# Patient Record
(demographics unavailable — no encounter records)

---

## 2024-10-22 NOTE — ASSESSMENT
[FreeTextEntry1] : 57-year-old woman here for evaluation for abnormal ECG  # RBBB with LAFB on EKG May 2024 # Atypical Chest pain, unlikely to be cardiac - Most recent Echo Grade 2 diastolic dysfunction, normal EF - CCTA ordered: Limited study, No definite stenosis of the LAD and RCA. Left circumflex is of small caliber and not well evaluated. CAD RADS 0/N - Started the pt on metoprolol 25mg, for cardioprotective effects and - counselled the patient on the importance of medication compliance  #A1c noted 7.1 #Lipid profile Chol 208, , non- July 2024 - advised pt to follow up with PCP for better management  RTC In 3-4months or as needed

## 2024-10-22 NOTE — HISTORY OF PRESENT ILLNESS
[FreeTextEntry1] : 57-year-old woman here for evaluation for abnormal ECG  She has obesity, depression and OM of her toes with amputation, was previously seen for RBBB on EKG, Echo: Grade 2 diastolic dysfunction, normal EF, she was asked to follow up after CCTA Atypical chest pain , sore like , reproducible   A1c: 7.1 July 2024 Lipid profile chol 208, , non  July 2024 ECG: RBBB May 2024 Stress test: Negative for ischemia in 2020  echo 1/2024 normal EF no significant valve disease.  CCTA 6/2024 rosalie, Ca score 0. no definite stenosis.

## 2024-10-22 NOTE — PHYSICAL EXAM
[Normal S1, S2] : normal S1, S2 [No Murmur] : no murmur [No Rub] : no rub [No Gallop] : no gallop [Clear Lung Fields] : clear lung fields [Good Air Entry] : good air entry [No Respiratory Distress] : no respiratory distress  [Soft] : abdomen soft [Non Tender] : non-tender [de-identified] : obese [de-identified] : 1+/2+ pitting edema

## 2024-10-22 NOTE — REVIEW OF SYSTEMS
[Chest Discomfort] : chest discomfort [SOB] : no shortness of breath [Dyspnea on exertion] : not dyspnea during exertion [Palpitations] : no palpitations [Orthopnea] : no orthopnea [PND] : no PND [Cough] : no cough [Wheezing] : no wheezing [FreeTextEntry5] : endorses chest soreness

## 2024-11-03 NOTE — HISTORY OF PRESENT ILLNESS
[FreeTextEntry1] : Ms. PATEL SALINAS Is a 56 year old female who presented for evaluation of type 2 Diabetes and hypothyroidism

## 2024-11-03 NOTE — ASSESSMENT
[FreeTextEntry1] : Controlled type 2 diabetes mellitus with hyperglycemia, with long-term current use of insulin (250.80,790.29,V58.67) (E11.65,Z79.4) Hypothyroidism, adult (244.9) (E03.9) Dyslipidemia (272.4) (E78.5)  # type 2 DM - A1c likely false due to anemia and CKD , advised to keep SMBG high numbers usually if eating bad otherwise normal Current Regimen: Ozempic 1 mg Q week, tresiba 40 units at bedtime, pioglitazone 15 mg daily , farxiga 10 mg daily Compliance: good - A1C: 7.1% - continue simvastatin 40 mg daily ACE/ARB : benazepril 20 mg daily Eye examination and podiatry annually   #hypothyroidism - c/w 100mcg

## 2024-11-26 NOTE — ASSESSMENT
[FreeTextEntry1] : #referral for JC with cr of 1.4 #CKD #HTN #DM #chronic osteomyelitis, with chronic foot ulcer -creatinine trend from bl of around 1.2> 1.4> 1.3 -had recent infection with hospitalization for foot abscess drainage, at the time cr was up to 1.4 - cw home meds for diabetes & HTN, BP controlled: 117/70 this visit - will repeat lab work in 6w & check complement levels & u/a

## 2024-11-26 NOTE — PHYSICAL EXAM
[General Appearance - In No Acute Distress] : in no acute distress [General Appearance - Well Developed] : well developed [Auscultation Breath Sounds / Voice Sounds] : lungs were clear to auscultation bilaterally [Heart Sounds] : normal S1 and S2 [Abdomen Tenderness] : non-tender [FreeTextEntry1] : trace R leg edema, left leg wrapped in dressing

## 2024-11-26 NOTE — HISTORY OF PRESENT ILLNESS
[FreeTextEntry1] : 56 y/o F w/ pmhx of DM II, DFU complicated /w chronic heel OM, HTN, asthma, hypothyroidism and anemia presenting for nephrology referral (for JC with cr of 1.4). Has CKD with bl around 1.2, has never followed up with any nephrologist before. Was recently in hospital back in august for foot abscess, and was given abx at the time.

## 2025-03-03 NOTE — HISTORY OF PRESENT ILLNESS
[FreeTextEntry1] : follow up [de-identified] : 58 y/o F w/ pmhx of DM II, DFU complicated /w chronic heel OM, HTN, HLD asthma, hypothyroidism and anemia presenting for  folllow up. Complains of generalized abdominal pain and feeling bloated since 6 months. Also endorses reflux. Pt eats fast food more often than not. No h/o recent travel,. Smokes marijuana occasionally

## 2025-03-03 NOTE — ASSESSMENT
[FreeTextEntry1] : 58 y/o F w/ pmhx of DM II, DFU complicated /w chronic heel OM, HTN, asthma, hypothyroidism and anemia presenting for follow up  #Gastritis -Patient had EGD/colono on 3/23/23 Normal mucosa in the whole esophagus. Erosions in the antrum compatible with erosive gastritis. -Cw protonix -Fu GI -Dietary changes advised   #DM #DFU - A1c7.1 , cw tresiba, farxiga, pioglitazone and ozempic -Cw aspirin -Follows with endo -Follows with podiatry  #Chest pain -EKG showing RBBB, ECHO-Grade 2 diastolic dysfunction, normal EF, -CCTA - neg CAD RADs 0 -Started on metopolol by cardio  #Lumbar pain #Siatica #Neuropathy -Takes gabapentin ocassionaly -No neuro defecits -Reffered to ortho per pts request  #HTN -Controlled -Cw benzapril and HCTZ  #JC  - the patient has elevated Cr 1.9-> 1.4->1.3 - Follows w nephrology  #Anemia multifactorial likely anemia of chronic disease w component of Iron deficiency deficiency - last Hb 11.2 in 8/9/24. iron studies normal, follows with heme - s/p IV Venofer 200mg x 5, completed in 8.16.2023  #Hypothyroidism -Cw 100mcg levothyroxine  #Hyperlipidemia - Chol 208, , non- July 2024. ASCVD-10% - Cw statin -Lifestyle and dietary changes   #health maintenance  -CBC, CMP, TST, Hb A1C and lipid profile were ordered -Colono appt on 6/25 -PAP done in 2024 neg, Fu w gyn this yr -Daphne gram done in 2023 : BIRADs 4. s/p stereotatic biospy showing  DIABETIC MASTOPATHY rto in 3 month and prn

## 2025-03-21 NOTE — HISTORY OF PRESENT ILLNESS
[de-identified] : 58-year-old female presents with low back pain.  Female for many years.  Radiates down both of her legs.  When she walks she is having some weakness in her legs.  She has numbness and tingling as well.  She has done physical therapy.  That has not helped her.  She has not had injections.

## 2025-03-21 NOTE — DATA REVIEWED
[FreeTextEntry1] : Obtained reviewed AP lateral flexion and she lumbar x-rays notes ability no fractures no dislocations

## 2025-03-21 NOTE — IMAGING
[de-identified] : TTP midline spine and paraspinal musculature  Strength                                          Hip flexor   Right: 5/5; Left: 5/5                              Knee extensor     Right: 5/5; Left: 5/5                      Ankle dorsiflexion   Right: N/A left: 5/5                   EHL           Right: 5/5; Left: 5/5                                 Ankle plantarflexion       Right: N/A; Left: 5/5  Sensation L1   Right: 2/2; Left: 2/2 L2   Right: 2/2; Left: 2/2 L3   Right: 2/2; Left: 2/2 L4   Right: 2/2; Left: 2/2 L5   Right: 2/2; Left: 2/2 S1   Right: 2/2; Left: 2/2  Reflexes Patella   Right: 2+; Left 2+ Achilles   Right: 2+; Left 2+ Clonus  Right: absent; L: absent

## 2025-03-21 NOTE — DISCUSSION/SUMMARY
[de-identified] : 58-year-old female presents with neurogenic claudication.  I am ordering MRI of her lumbar spine.  She is getting physical therapy I will see her back

## 2025-04-07 NOTE — PHYSICAL EXAM
[Restricted in physically strenuous activity but ambulatory and able to carry out work of a light or sedentary nature] : Status 1- Restricted in physically strenuous activity but ambulatory and able to carry out work of a light or sedentary nature, e.g., light house work, office work [Obese] : obese [Normal] : normal appearance, no rash, nodules, vesicles, ulcers, erythema [de-identified] : chronic bilateral lower extremity edema, R>L  [de-identified] : ambulating with assistance of walker

## 2025-04-07 NOTE — HISTORY OF PRESENT ILLNESS
[de-identified] : 56 year old female with pmhx of DM, asthma, HLD, DM Gastroparesis, Left foot ulcers s/p surgery (Fixator) in 6/22  followed by revision surgery in 7/22 complicated by acute OM of right foot s/p 6 weeks of abx in july  was sent to the clinic due to for evaluation of anemia and weight loss.She occasionally has mild bleeding from hemorrhoids.  She denies blood in stools, blood in the urine, bleeding from vagina. SHe also noticed weight loss 235 lbs in 2 to 3 months. Ros otherwise negative. [de-identified] : 1/11/23 pt presents to the clinic for follow up. She occasionally has mild bleeding from right foot ulcers. However denies bleeding from any other site. No fevers, shortness of breath, chest pain and dizziness. Has mild pain in right foot has an appointment with podiatrist and will  see him on 1/12/23.   7/19/23 Patient is here for a follow-up visit for iron deficiency anemia.  Reviewed most recent CBC, which shows mild leukopenia and mild anemia with hgb 10.6g/dL; ferritin 24, ironsat 10%, TIBC 411.  eGFR is 48.  She has controlled T2DM.  Patient denies fever, CP, palpitations, dizziness, dyspnea, hematuria or PRBRB.  She endorses pica (craving ice chips).  She does note occasional black stools.  She had mammography in 02/2023 which was BI-RADS Category 2: Benign.  Reviewed most recent chart note from GYN visit last week, Pap up to date from 2021.  Last colonoscopy 2021; she had attempt at repeat colonoscopy in 03/2023 which was rescheduled due to poor prep.  Patient underwent upper EGD in 03/2023 with Dr. Mendoza which shows erosive gastritis.    9/20/23 Patient is here for a follow-up visit for iron deficiency anemia.  She completed IV venofer x5 back on 8.16.2023.  She feels fatigue.  She is not currently taking Vitamin B12.   Patient denies fever, CP, palpitations, dizziness, dyspnea, hematuria, pica or dark stools.  She endorses occasional constipation, which sometimes aggravates her hemorrhoids (scant pink smear on toilet paper).   She had mammography performed in 07/2023 and underwent stereotactic right breast biopsy which was consistent with diabetic mastopathy.   Mammogram (7.24.2023) IMPRESSION:Focal asymmetry in the right superior breast. Stereotactic guided biopsy is recommended.Recommendation: Stereotactic guided biopsy.BI-RADS Category 4: Suspicious  12/20/23 Patient is here for a follow-up visit for iron deficiency anemia.  She completed IV venofer x5 back on 8.16.2023.  She is not currently taking Vitamin B12.  Reviewed most recent CBC which was from several months ago with hgb 12.1g/dL following IV iron repletion, Patient denies fevers, palpitations, dizziness, dyspnea at rest, hematuria or black stools.  She has been taking oral iron twice per week but will stop due to constipation.    4/1/24 Patient is here for a follow-up visit for iron deficiency anemia, accompanied by family member.  She completed IV venofer x5 back on 8.16.2023.  She is taking Vitamin B12 once daily, prescribed by PCP.  Reviewed most recent CBC which shows hgb 11.7g/dL and mild leukopenia; ferritin 66, ironsat 21%, TIBC 317.  Renal function is slightly worse but patient states she has not been drinking as much water.  Patient denies fevers, palpitations, dizziness, dyspnea at rest, hematuria or black stools.  She has not yet had repeat colonoscopy despite recommendations to reschedule.    6/20/24 Patient is here for a follow-up visit for multifactorial anemia.  She completed IV venofer x5 back on 8.16.2023.  She is taking Vitamin B12 once daily, prescribed by PCP.  Reviewed most recent CBC which shows hgb 11.5g/dL and mild leukopenia; ferritin 86, ironsat 19%, TIBC 337.  Renal function is slightly improved.  Patient denies fevers, palpitations, dizziness, dyspnea at rest, hematuria or black stools.  She has not yet had repeat colonoscopy despite recommendations to reschedule.    4/7/25

## 2025-04-07 NOTE — ASSESSMENT
[FreeTextEntry1] : 57 year old female with chronic Right foot ulcer 2/2 DM was following for anemia  # Anemia, multifactorial likely anemia of chronic disease and Bone marrow suppression from chronic Right foot ulcer with component of Iron deficiency and ?B12 deficiency - hgb baseline 10 to 11.  - iron saturation and ferritin low  - B12 was previously low but normalized with supplementation; MMA and folate wnl.  - s/p upper EGD in 03/2023 with Dr. Mendoza which shows erosive gastritis.  - s/p IV Venofer 200mg x 5, completed in 8.16.2023  - reiterated importance to followup with GI as recommended for repeat colonoscopy; colonoscopy 2/2023 was poor prep  - will hold off IV iron at this time   # Chronic DM foot ulcer  - following with podiatry  - currently on antibiotics   RTC in 2 months with Dr. Ceballos with CBC, BMP, LFTs, iron studies, ferritin, MMA level 5 days prior

## 2025-04-07 NOTE — REVIEW OF SYSTEMS
[Fatigue] : fatigue [Joint Pain] : no joint pain [Negative] : Heme/Lymph [FreeTextEntry9] : wearing surgical boot on Right lower extremity

## 2025-04-09 NOTE — DISCUSSION/SUMMARY
[de-identified] : 58-year-old female with lumbar radiculopathy neurogenic claudication.  I am recommending follow-up with pain management to discuss injections.  Patient failed conservative care including physical therapy.  I do not advise surgery at this time.

## 2025-04-09 NOTE — DATA REVIEWED
[FreeTextEntry1] : I reviewed the patient's MRI of her lumbar spine she has some multilevel degenerative disc disease with very mild stenosis.

## 2025-04-15 NOTE — PHYSICAL EXAM
[Normal Coordination] : normal coordination [Normal Sensation] : normal sensation [Normal Mood and Affect] : normal mood and affect [Oriented] : oriented [Able to Communicate] : able to communicate [Well Developed] : well developed [Well Nourished] : well nourished [NL (90)] : forward flexion 90 degrees [Diminished] : patella reflex diminished [de-identified] : Obese [NL (30)] : extension 30 degrees [Flexion] : flexion [Extension] : extension [] : antalgic [de-identified] : Luh testing is positive for reproduction of pain at the PSIS, and there is a positive Melissa Finger test, thigh thrust, and a positive Gaenslen's test bilaterally.

## 2025-04-15 NOTE — ASSESSMENT
[FreeTextEntry1] : This is a 58-year-old female with complaints of left lower back pain. The pain travels into the left lower extremity. Pain is associated with numbness and tingling.  The pain has not responded to conservative care, including medications, stretching, as well as active modalities, such as physical therapy. There is tenderness over the left sacroiliac joint with palpation. We will proceed with a diagnostic left SI joint injection w/ p.o. valium. Patient will follow up in 1-2 weeks post injection for reassessment. In the interim, I will order an EMG of the lower extremities to rule out neuropathy. All this patients questions were answered and the conversation was understood well.  A LowerExtremity EMG/NCV was ordered to delineate radiculopathy vs neuropathies vs nerve damage.  Patient had pain on PSIS area, Carlos's positive and pelvic rocking positive. Patient has trialed rehab (Home exercise, physical therapy or chiropractic care) and medications. We will schedule a left diagnostic and therapeutic sacroiliac joint injection. If greater than 50% relief for greater than 30 minutes, would do a second left sacroiliac joint injection in 1-2 weeks. With greater than 50% relief for 6-8 weeks, a left sacroiliac joint injection could be repeated in 3 months vs RFA or referral to neurosurgeon.  Patient will undergo a procedure. I will send Valium 5 mg 1-2 tabs to the pharmacy. I advised to take one tablet 45 mins prior to the procedure. If it is not effective, take the 2nd tablet.  Risk including bleeding and infection, benefits, pros and cons of procedure were explained to the patient using models and diagrams and their questions were answered.  Entered by Nicolasa West, acting as scribe for Dr. Morales.  Documentation recorded by the scribe, in my presence, accurately reflects the service I personally performed, and the decisions made by me with my edits as appropriate.     Thank you for allowing me to assist in the management of this patient.     Best Regards,     Lu Morales M.D., FAAPMR     Diplomate, American Board of Physical Medicine and Rehabilitation Diplomate, American Board of Pain Medicine

## 2025-04-15 NOTE — DATA REVIEWED
[FreeTextEntry1] : MRI of the lumbar spine dated 3/26/25 demonstrates mild spinal curvature with mild rotary component. Otherwise normal lumbar spine MRI.

## 2025-04-15 NOTE — HISTORY OF PRESENT ILLNESS
[FreeTextEntry1] : This is a 58-year-old female here to establish care for lower back pain that travel into the left lower extremity. She is referred to me by Dr. Issa for a pain management consultation. She trialed physical therapy specifically for her gait but not so much for the pain. The MRI of the lumbar spine was reviewed with the patient and is documented below. She has diabetes and I believe she has a component of neuropathy. Upon physical examination, there is tenderness over the left SI joint. The option to trial a diagnostic SI joint injection was discussed and she is interested. I advised that a steroid injection would likely elevate her blood sugar. She is understanding.   She has undergone several surgeries on her right ankle, the most recent in December 2024. She continues to wear a brace.

## 2025-04-17 NOTE — PHYSICAL EXAM
[de-identified] : 59 yo female with extensive right lower leg history and surgeries and diabetes with ORIF with chronic wounds right lower leg .  Physical examination consistent with right lower leg multiple wounds measures 1x1cm and 0.5x0.5 cm and 0.8x0.8cm with yellow fluid drainage. A wound culture was obtained.   The patient was instructed to clean  the wound with soap and water. Continue local wound care. Wash the wound with hibiclens soap and apply topical mupiricin ointment. Follow up 1-2 months. Agree with pllanned debridement by ortho.

## 2025-05-02 NOTE — PROCEDURE
[FreeTextEntry3] : SACROILIAC JOINT INJECTION UNDER FLUOROSCOPY     Date:  2025  Patient: Tamar Diaz  :  1966  Preoperative Diagnosis: Left SIJ Arthropathy  Procedure: Left SIJ Injection under Fluoroscopy  Phys[facility]: Lu Morales [facility], [facility]  Anesthesia: See nurses note/Local/Cold spray. Valium 10 mg p.o.  Medical Necessity:  Failure of conservative management.  Indications:  The patient was admitted for a median branch injection for diagnostic purposes.  The patient was explained the technique, complications and rationale for the procedure and elected to proceed.  Indication for Fluoroscopy:  This procedure requires the precise placement of the spinal needle.  It is the only way to accurately and safely perform the injection.  CONSENT: The possible complications including infection, bleeding, nerve damage, Hospital admission, death or failure of the procedure; though unusual, are theoretically possible. The patient was educated about the of the procedure and alternative therapies. All questions were answered and the patient freely gave consent to proceed.  Monitoring:  Patient had continuous blood pressure, EKG, and pulse oximetry throughout the case. See nurse's notes  PROCEDURE NOTE:  After obtaining written consent, the patient was placed on the fluoroscopic table in the prone position. A time out was performed.  Fluoroscopic guidance was used to isolate and identify the Right/Left sacroiliac joint.  A medial to lateral oblique projection allowed separation of the anterior (lateral) and posterior (medial) joint space.  The sacrum and posterior iliac crest was prepped with Betadine and draped in usual sterile fashion. Cold spray was used to localize the area. A 22-gauge 3.5 inch spinal needle was directed into the inferior aspect of the sacroiliac joint using a posterior approach in bull's eye fashion. The interosseous ligament was engaged which provoked responses consisting of her typical painful symptoms. A 2 cc total volume of lidocaine 2% ( 1  cc) and depomedrol 40mg (1/2 cc) was injected. Volumes were kept small-commensurate with the joint's capacity as determined by end-injection back pressure on the syringe. The needle was removed.    Sacro-iliac Joint Radiography:  Omnipaque 240mg/cc - 1/2 cc was injected in the inferior pole of the SIJ and the entire sacroiliac joint was outlined under fluoroscopy.   Complications: None. The patient tolerated the procedure well.   Disposition: I have examined the patient and there are no new physical findings since the original presentation.  Sensory and motor function were intact. The patient met discharge criteria see nurses notes. The discharge instruction sheet was reviewed and given to the patient. The patient was discharged home with a . If patient gets sustained relief will have patient do modified planks 3 times a day on carpet or yoga mat starting at 5 seconds building up to 1 minute without grimacing/Valsalva and walking.  If patient gets sustained relief will have patient do modified planks 3 times a day on carpet or yoga mat starting at 5 seconds building up to 1 minute without grimacing/Valsalva and walking.     Comment: If 70% relief greater than 2 hours or 50% greater than 24 hours would proceed to RFA. If 50% less than 24 hours would repeat to confirm for RFA. Follow in office. Call if any problems.   This document was electronically signed by:    Lu Morales MD, FAAPMR Diplomate, American Board of Physical Medicine and Rehabilitation Diplomate, American Board of Pain Medicine

## 2025-05-13 NOTE — ASSESSMENT
[FreeTextEntry1] : This is a 58-year-old female with complaints of left lower back pain. The pain travels into the left lower extremity. Symptoms were suggestive of Left SI joint dysfunction. She underwent a Left SIJ injection with Valium on 5/2/25 with 80% relief of her left SI joint pain and left lower extremity radiculopathy X 3 days. After the third day, her pain relief is about 50% now. She will proceed with a Left SI joint RFA. She will follow up post-RFA for reassessment.   Patient had pain in PSIS area, positive Carlos's and pelvic rocking. Patient has trialed rehab (home exercise, physical therapy or chiropractic manipulation) and medications. Patient had 50% relief with diagnostic SI Joint injection. Will schedule a Left SI Joint RFA. If no relief, refer to neurosurgery  Patient will undergo a procedure. Valium 5 mg 2 tabs was sent to the pharmacy. Patient was told that nursing staff will advise directions on how to take the medication.  Risk, benefits, pros and cons of procedure were explained to the patient using models and diagrams and their questions were answered.  COURTNEY Chester MD

## 2025-05-13 NOTE — HISTORY OF PRESENT ILLNESS
[FreeTextEntry1] : This is a 58-year-old female here to establish care for lower back pain that travel into the left lower extremity. She is referred to me by Dr. Issa for a pain management consultation. She trialed physical therapy specifically for her gait but not so much for the pain. The MRI of the lumbar spine was reviewed with the patient and is documented below. She has diabetes and I believe she has a component of neuropathy. Upon physical examination, there is tenderness over the left SI joint. The option to trial a diagnostic SI joint injection was discussed and she is interested. I advised that a steroid injection would likely elevate her blood sugar. She is understanding.   She has undergone several surgeries on her right ankle, the most recent in December 2024. She continues to wear a brace.   TODAY: Patient presents for a revisit. She underwent a Left SIJ injection with Valium on 5/2/25 with 80% relief of her left SI joint pain and left lower extremity radiculopathy X 3 days. After the third day, her pain relief is about 50% now. She states she is able to sit more comfortably on her left buttock which she wasn't able to do prior to the injection. The left leg pain is not as intense. She is able to walk longer periods of time with her Rollator. Patient is a candidate for a left SI joint RFA for longer-standing relief which she wishes to proceed with. Of note, patient was referred for a lower extremity EMG which was not done.

## 2025-05-13 NOTE — PHYSICAL EXAM
[Normal Coordination] : normal coordination [Normal Sensation] : normal sensation [Normal Mood and Affect] : normal mood and affect [Oriented] : oriented [Able to Communicate] : able to communicate [Well Developed] : well developed [Well Nourished] : well nourished [NL (90)] : forward flexion 90 degrees [NL (30)] : extension 30 degrees [Flexion] : flexion [Extension] : extension [Diminished] : patella reflex diminished [de-identified] : Obese [] : no swelling [de-identified] : Luh testing is positive for reproduction of pain at the PSIS, and there is a positive Melissa Finger test, thigh thrust, and a positive Gaenslen's test bilaterally.

## 2025-05-15 NOTE — PHYSICAL EXAM
[de-identified] : 59 yo female with extensive right lower leg history and surgeries and diabetes with ORIF with chronic wounds right lower leg .  Physical examination consistent with right lower leg multiple wounds measures 1x1cm and 0.5x0.5 cm and 0.8x0.8cm with yellow fluid drainage. A wound culture was obtained. Pt last culture positive and treated with oral abx.    The patient was instructed to clean  the wound with soap and water. Continue local wound care. Wash the wound with hibiclens soap and apply topical mupiricin ointment. Follow up 1-2 months. Agree with pllanned debridement by ortho.

## 2025-06-04 NOTE — DISCUSSION/SUMMARY
[de-identified] : I had a lengthy discussion with the patient regarding her condition.  Unfortunately the best option for her at this time is most likely an amputation however the patient is adamant against an amputation.  We could attempt salvage with a two-stage or multiple staged surgery in which the first would involve a incision and drainage.  The second stage would involve a bone block arthrodesis or arthrodesis through a cage.  I did advise the patient that this would most likely fail given her soft tissue envelope.  However the patient would like to attempt salvage.  The first step is to obtain advanced imaging.  I would like her to get her operative reports.  Will obtain a CT and MRI.  I will discuss results with her over the phone.  All questions answered

## 2025-06-04 NOTE — PHYSICAL EXAM
[de-identified] : There is drainage coming from several areas around the ankle most likely at the level of the prior incisions and at the screw entry sites.  There is pain with passive range of motion at the ankle.  There is some motion through the arthrodesis site.

## 2025-06-04 NOTE — HISTORY OF PRESENT ILLNESS
[de-identified] : 58-year-old patient here to see me for follow-up of her right leg.  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  The patient's pains been worsening.  Denies any systemic symptoms.

## 2025-06-09 NOTE — ASSESSMENT
[FreeTextEntry1] : 59 y/o F w/ pmhx of DM II, DFU complicated /w chronic heel OM, HTN, asthma, hypothyroidism and anemia presenting for follow up  #right foot pain - pain exacerbated with excessive walking at concert - lidocaine patch - pain management referral  #Gastritis -Patient had EGD/colono on 3/23/23 Normal mucosa in the whole esophagus. Erosions in the antrum compatible with erosive gastritis. -Cw protonix -Fu GI -Dietary changes advised   #DM #DFU - A1c 6.3 , cw tresiba, farxiga, pioglitazone and ozempic -Cw aspirin -Follows with endo -Follows with podiatry  #Chest pain -EKG showing RBBB, ECHO-Grade 2 diastolic dysfunction, normal EF, -CCTA - neg CAD RADs 0 -Started on metoprolol by cardio  #Lumbar pain #Siatica #Neuropathy -Takes gabapentin ocassionaly -No neuro defecits -Referred to ortho per pts request  #HTN -Controlled -Cw benzapril and HCTZ  #JC  - the patient has elevated Cr 1.9-> 1.4->1.3 - Follows w nephrology  #Anemia multifactorial likely anemia of chronic disease w component of Iron deficiency deficiency - last Hb 11.2 in 8/9/24. iron studies normal, follows with heme - s/p IV Venofer 200mg x 5, completed in 8.16.2023  #Hypothyroidism -Cw 100mcg levothyroxine  #Hyperlipidemia - Chol 208, , non- July 2024. ASCVD-10% - Cw statin -Lifestyle and dietary changes  #health maintenance  -CBC, CMP, TST, Hb A1C and lipid profile were ordered -[facility] appt on 6/25 -PAP done in 2024 neg, Fu w gyn this yr -Daphne gram done in 2023 : BIRADs 4. s/p stereotatic biospy showing  DIABETIC MASTOPATHY rto in 3 month and prn

## 2025-06-09 NOTE — HISTORY OF PRESENT ILLNESS
[FreeTextEntry1] : follow up [de-identified] : 59 y/o F w/ pmhx of DM II, DFU complicated /w chronic heel OM, HTN, HLD asthma, hypothyroidism and anemia presenting for folllow up. Patient reports 2 weeks ago she went to a concert two weeks ago and was walking excessively and has had leg pain ever since. The pain is localized to the right leg foot and radiates to the shin. She follows with Ortho and podiatry who sent her for xrays and MRI. The xrays were completed Monday at [facility]. MRI and CT scan will be scheduled. She completed her doxycycline course prescribed by Dr Rodriguez and follows with him and podiatry for her foot ulcer.

## 2025-06-22 NOTE — HISTORY OF PRESENT ILLNESS
[de-identified] : 58-year-old female presents to the continued low back pain radiating down the left leg over 6 weeks physical therapy without relief screening MRI of the lumbar spine. Yes

## 2025-07-03 NOTE — PHYSICAL EXAM
[de-identified] : 57 yo female with extensive right lower leg history and surgeries and diabetes with ORIF with chronic wounds right lower leg .  Physical examination consistent with right lower leg multiple wounds measures 1x1cm and 0.5x0.5 cm and 0.8x0.8cm  and granulatingA wound culture was obtained  .  The patient was instructed to clean  the wound with soap and water. d/c mupericin and start bandaid.  Follow up 1-2 months.

## 2025-07-24 NOTE — HISTORY OF PRESENT ILLNESS
[de-identified] : 58-year-old patient with complex history involving her right lower extremity who presents for review of her imaging and discussion of surgery.  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  She has several areas with draining sinuses and chronic wounds.

## 2025-07-24 NOTE — PHYSICAL EXAM
[de-identified] : I remove the bandaging.  She has 2 sinus tracts likely at the entry points of the cannulated screws.  There is a chronic wound distally as well over the lateral foot.  There is drainage that is not quite purulent but not purely serosanguineous likely infected.  There is generalized varus deformity at the ankle and supination of the foot.  There is no significant instability present i.e. I am not able to passively move the ankle joint through the nonunion.

## 2025-07-24 NOTE — DISCUSSION/SUMMARY
[de-identified] : I had a long and thorough discussion with the patient regarding her clinical and radiographic findings along with my suggestion for care.  It is very unlikely that we will ever be able to get the foot back to a plantigrade stable position even if we were to clear the infection.  There is been too much bone destruction and the infection is too widespread at this point to realistically achieve this.  However an attempt to have the wound heal we could remove the 2 cannulated screws, perform a debridement and placed antibiotic beads.  This might give her some resolution to her draining wounds and along with chronic suppressive antibiotics and a Crow boot allow her to salvage her limb.  She is at this point adamant against a amputation.  The risks of surgery discussed included but not limited to persistent infection, persistent wounds, inability to remove the hardware, DVT, PE, loss of limb, loss of life.  The patient was to proceed forward with surgery.  All questions sought and answered

## 2025-07-24 NOTE — DATA REVIEWED
[FreeTextEntry1] : I reviewed the patient's CT scan and MRI.  Both demonstrate a persistent nonunion at the ankle joint.  There are several sinus tracts tracking from the hardware present.  There is extensive osteomyelitis throughout the entire foot and ankle along with destructive bony changes seen.